# Patient Record
Sex: FEMALE | Race: WHITE
[De-identification: names, ages, dates, MRNs, and addresses within clinical notes are randomized per-mention and may not be internally consistent; named-entity substitution may affect disease eponyms.]

---

## 2018-01-08 NOTE — OR
DATE OF PROCEDURE:  12/28/2017

 

PREOPERATIVE DIAGNOSIS:  Family history of colon carcinoma.

 

POSTOPERATIVE DIAGNOSIS:  Normal colonoscopic examination.

 

OPERATIVE PROCEDURE:  Flexible colonoscopy.

 

ANESTHESIA:  IV sedation.

 

INDICATION FOR PROCEDURE:  A 52-year-old female presenting for a screening colonoscopy.  She

does have a family history of her father having colon carcinoma.  Plan is to proceed with a

flexible colonoscopy, biopsies, and/or polypectomy as indicated.  Potential risks including

bleeding and perforation were discussed, and the patient wishes to proceed.

 

DETAILS OF PROCEDURE:  The patient was taken to the operating room and placed in a left

lateral decubitus position.  IV sedation was administered, after which the initial digital

rectal exam was performed and was unremarkable.  Colonoscope was then passed into the rectum

with retroflexion revealing uncomplicated hemorrhoidal columns.  The scope was eventually

passed to the level of the cecum.  The prep was quite good with there only being a small

amount of liquid stool present to that level, no abnormalities were noted.  Specifically, no

diverticula, no areas of colitis, no polyps or other signs of neoplasia.  The scope was then

withdrawn.  The above findings reconfirmed and the procedure concluded.  The patient was

taken to the recovery room in satisfactory condition.

 

The recommendations given the family history of colon carcinoma would be to repeat the

colonoscopy in 5 years.

 

 

 

 

Niraj Light MD

DD:  01/06/2018 07:55:36

DT:  01/08/2018 10:59:33

Job #:  [id number]

## 2022-12-21 ENCOUNTER — HOSPITAL ENCOUNTER (EMERGENCY)
Dept: HOSPITAL 11 - JP.ED | Age: 57
Discharge: HOME | End: 2022-12-21
Payer: COMMERCIAL

## 2022-12-21 DIAGNOSIS — Z88.0: ICD-10-CM

## 2022-12-21 DIAGNOSIS — Z88.1: ICD-10-CM

## 2022-12-21 DIAGNOSIS — I16.0: Primary | ICD-10-CM

## 2022-12-21 DIAGNOSIS — Z79.84: ICD-10-CM

## 2022-12-21 DIAGNOSIS — Z88.5: ICD-10-CM

## 2022-12-21 DIAGNOSIS — Z91.040: ICD-10-CM

## 2022-12-21 DIAGNOSIS — M19.90: ICD-10-CM

## 2022-12-21 DIAGNOSIS — Z79.899: ICD-10-CM

## 2022-12-21 DIAGNOSIS — Z88.8: ICD-10-CM

## 2022-12-21 DIAGNOSIS — E11.9: ICD-10-CM

## 2022-12-21 LAB — TROPONIN I SERPL HS-MCNC: 9.1 PG/ML (ref ?–60.3)

## 2022-12-21 PROCEDURE — 85025 COMPLETE CBC W/AUTO DIFF WBC: CPT

## 2022-12-21 PROCEDURE — 99283 EMERGENCY DEPT VISIT LOW MDM: CPT

## 2022-12-21 PROCEDURE — 80048 BASIC METABOLIC PNL TOTAL CA: CPT

## 2022-12-21 PROCEDURE — 84484 ASSAY OF TROPONIN QUANT: CPT

## 2022-12-21 PROCEDURE — 93005 ELECTROCARDIOGRAM TRACING: CPT

## 2022-12-21 PROCEDURE — 36415 COLL VENOUS BLD VENIPUNCTURE: CPT

## 2025-05-27 ENCOUNTER — HOSPITAL ENCOUNTER (EMERGENCY)
Dept: HOSPITAL 11 - JP.ED | Age: 60
LOS: 1 days | Discharge: HOME | End: 2025-05-28
Payer: COMMERCIAL

## 2025-05-27 DIAGNOSIS — Z88.8: ICD-10-CM

## 2025-05-27 DIAGNOSIS — I16.0: Primary | ICD-10-CM

## 2025-05-27 DIAGNOSIS — Z88.1: ICD-10-CM

## 2025-05-27 DIAGNOSIS — Z91.040: ICD-10-CM

## 2025-05-27 DIAGNOSIS — Z79.84: ICD-10-CM

## 2025-05-27 DIAGNOSIS — Z88.0: ICD-10-CM

## 2025-05-27 DIAGNOSIS — Z79.899: ICD-10-CM

## 2025-05-27 DIAGNOSIS — E11.9: ICD-10-CM

## 2025-05-27 LAB
ANION GAP SERPL CALC-SCNC: 13.4 MMOL/L (ref 5–14)
BASOPHILS # BLD AUTO: 0.06 K/UL (ref 0–0.1)
BASOPHILS NFR BLD AUTO: 0.7 % (ref 0.1–1.3)
CREAT CL 24H UR+SERPL-VRATE: 42.97 ML/MIN
EOSINOPHIL NFR BLD AUTO: 3.6 % (ref 0–5.4)
HCT VFR BLD AUTO: 33.4 % (ref 34.3–46)
HGB BLD-MCNC: 10.9 G/DL (ref 11.2–15.5)
IMM GRANULOCYTES # BLD: 0.03 K/UL (ref 0–0.23)
IMM GRANULOCYTES NFR BLD: 0.4 % (ref 0–0.7)
LYMPHOCYTES # BLD AUTO: 2.23 K/UL (ref 0.8–3.3)
LYMPHOCYTES NFR BLD AUTO: 27.1 % (ref 11.4–47.7)
MCH RBC QN AUTO: 29.7 PG (ref 31.6–35.5)
MCHC RBC AUTO-ENTMCNC: 32.6 G/DL (ref 31.6–35.5)
MONOCYTES # BLD AUTO: 0.93 K/UL (ref 0.2–0.9)
MONOCYTES NFR BLD AUTO: 11.3 % (ref 3.3–12.6)
NEUTROPHILS # BLD AUTO: 4.69 K/UL (ref 1–7.6)
NEUTROPHILS NFR BLD AUTO: 56.9 % (ref 40–78.1)
PLATELET # BLD AUTO: 360 K/UL (ref 130–375)
POTASSIUM SERPL-SCNC: 3.4 MMOL/L (ref 3.6–5.2)
RBC # BLD AUTO: 3.67 M/UL (ref 3.77–5.24)
TROPONIN I SERPL HS-MCNC: 7.5 PG/ML (ref ?–60.3)
WBC # BLD AUTO: 8.2 K/UL (ref 3.2–11)

## 2025-05-27 PROCEDURE — 84484 ASSAY OF TROPONIN QUANT: CPT

## 2025-05-27 PROCEDURE — 80048 BASIC METABOLIC PNL TOTAL CA: CPT

## 2025-05-27 PROCEDURE — 85025 COMPLETE CBC W/AUTO DIFF WBC: CPT

## 2025-05-27 PROCEDURE — 99285 EMERGENCY DEPT VISIT HI MDM: CPT

## 2025-05-27 PROCEDURE — 36415 COLL VENOUS BLD VENIPUNCTURE: CPT

## 2025-05-27 PROCEDURE — 93005 ELECTROCARDIOGRAM TRACING: CPT

## 2025-06-26 ENCOUNTER — HOSPITAL ENCOUNTER (OUTPATIENT)
Dept: HOSPITAL 11 - JP.SDS | Age: 60
Discharge: HOME | End: 2025-06-26
Attending: SURGERY
Payer: COMMERCIAL

## 2025-06-26 DIAGNOSIS — Z12.11: Primary | ICD-10-CM

## 2025-06-26 DIAGNOSIS — Z80.0: ICD-10-CM

## 2025-06-26 DIAGNOSIS — I10: ICD-10-CM

## 2025-06-26 DIAGNOSIS — K57.30: ICD-10-CM

## 2025-06-26 PROCEDURE — 45378 DIAGNOSTIC COLONOSCOPY: CPT

## 2025-06-26 RX ADMIN — SODIUM CHLORIDE, SODIUM LACTATE, POTASSIUM CHLORIDE, AND CALCIUM CHLORIDE SCH MLS/HR: .6; .31; .03; .02 INJECTION, SOLUTION INTRAVENOUS at 08:46
